# Patient Record
Sex: MALE | Race: WHITE | URBAN - METROPOLITAN AREA
[De-identification: names, ages, dates, MRNs, and addresses within clinical notes are randomized per-mention and may not be internally consistent; named-entity substitution may affect disease eponyms.]

---

## 2018-07-10 ENCOUNTER — TELEPHONE (OUTPATIENT)
Dept: INTERNAL MEDICINE | Facility: CLINIC | Age: 28
End: 2018-07-10

## 2018-07-10 NOTE — TELEPHONE ENCOUNTER
7/10/2018    Call Regarding ReattributionPhysical       Attempt 1    Message on voicemail     Comments:       Outreach   SV